# Patient Record
Sex: FEMALE
[De-identification: names, ages, dates, MRNs, and addresses within clinical notes are randomized per-mention and may not be internally consistent; named-entity substitution may affect disease eponyms.]

---

## 2017-11-20 ENCOUNTER — HOSPITAL ENCOUNTER (OUTPATIENT)
Dept: HOSPITAL 63 - SPEC | Age: 52
Discharge: HOME | End: 2017-11-20
Attending: NURSE PRACTITIONER
Payer: COMMERCIAL

## 2017-11-20 DIAGNOSIS — I10: ICD-10-CM

## 2017-11-20 DIAGNOSIS — E11.9: Primary | ICD-10-CM

## 2017-11-20 LAB
ALBUMIN SERPL-MCNC: 3.9 G/DL (ref 3.4–5)
ALBUMIN/GLOB SERPL: 0.9 {RATIO} (ref 1–1.7)
ALP SERPL-CCNC: 113 U/L (ref 46–116)
ALT SERPL-CCNC: 19 U/L (ref 14–59)
ANION GAP SERPL CALC-SCNC: 7 MMOL/L (ref 6–14)
AST SERPL-CCNC: 14 U/L (ref 15–37)
BILIRUB SERPL-MCNC: 0.2 MG/DL (ref 0.2–1)
BUN/CREAT SERPL: 27 (ref 6–20)
CA-I SERPL ISE-MCNC: 19 MG/DL (ref 7–20)
CALCIUM SERPL-MCNC: 10 MG/DL (ref 8.5–10.1)
CHLORIDE SERPL-SCNC: 103 MMOL/L (ref 98–107)
CO2 SERPL-SCNC: 28 MMOL/L (ref 21–32)
CREAT SERPL-MCNC: 0.7 MG/DL (ref 0.6–1)
GFR SERPLBLD BASED ON 1.73 SQ M-ARVRAT: 88.2 ML/MIN
GLOBULIN SER-MCNC: 4.2 G/DL (ref 2.2–3.8)
GLUCOSE SERPL-MCNC: 111 MG/DL (ref 70–99)
POTASSIUM SERPL-SCNC: 4.2 MMOL/L (ref 3.5–5.1)
PROT SERPL-MCNC: 8.1 G/DL (ref 6.4–8.2)
SODIUM SERPL-SCNC: 138 MMOL/L (ref 136–145)

## 2017-11-20 PROCEDURE — 84443 ASSAY THYROID STIM HORMONE: CPT

## 2017-11-20 PROCEDURE — 80053 COMPREHEN METABOLIC PANEL: CPT

## 2017-11-20 PROCEDURE — 83036 HEMOGLOBIN GLYCOSYLATED A1C: CPT

## 2017-11-20 PROCEDURE — 36415 COLL VENOUS BLD VENIPUNCTURE: CPT

## 2017-11-20 PROCEDURE — 80061 LIPID PANEL: CPT

## 2017-11-21 LAB
CHOLEST SERPL-MCNC: 160 MG/DL (ref 0–200)
CHOLEST/HDLC SERPL: 4 {RATIO}
HDLC SERPL-MCNC: 39 MG/DL (ref 40–60)
LDLC: 92 MG/DL (ref 0–100)
THYROID STIM HORMONE (TSH): 1.27 UIU/ML (ref 0.36–3.74)
TRIGL SERPL-MCNC: 146 MG/DL (ref 0–150)
VLDLC: 29 MG/DL (ref 0–40)

## 2017-11-22 LAB — HBA1C MFR BLD: 6.3 % (ref 4.8–5.6)

## 2018-08-21 ENCOUNTER — HOSPITAL ENCOUNTER (OUTPATIENT)
Dept: HOSPITAL 63 - ECHO | Age: 53
Discharge: HOME | End: 2018-08-21
Payer: COMMERCIAL

## 2018-08-21 DIAGNOSIS — E11.9: ICD-10-CM

## 2018-08-21 DIAGNOSIS — I10: ICD-10-CM

## 2018-08-21 DIAGNOSIS — I36.1: Primary | ICD-10-CM

## 2018-08-21 DIAGNOSIS — E66.9: ICD-10-CM

## 2018-08-21 PROCEDURE — 93306 TTE W/DOPPLER COMPLETE: CPT

## 2018-08-21 NOTE — CARD
MR#: [id number]

Account#: [id number]

Accession#: [id number]

Date of Study: 08/21/2018

Ordering Physician: ELAN LIVE, 

Referring Physician: ELAN LIVE, 

Tech: FORTUNATO Damon





--------------- APPROVED REPORT --------------





EXAM: Two-dimensional and M-mode echocardiogram with Doppler and color Doppler.



Other Information 

Quality : AverageHR: 88bpm



INDICATION

CVA/TIA 



RISK FACTORS

Hypertension 

Obesity   



2D DIMENSIONS 

RVDd3.0 (2.9-3.5cm)Left Atrium(2D)3.8 (1.6-4.0cm)

IVSd1.7 (0.7-1.1cm)Aortic Root(2D)3.3 (2.0-3.7cm)

LVDd3.4 (3.9-5.9cm)LVOT Diameter2.0 (1.8-2.4cm)

PWd2.2 (0.7-1.1cm)LVDs2.5 (2.5-4.0cm)

FS (%) 27.4 %SV26.7 ml

LVEF(%)54.4 (>50%)



Aortic Valve

AoV Peak Cruz.139.0cm/sAoV VTI27.6cm

AO Peak GR.7.7mmHgLVOT Peak Cruz.85.2cm/s

LVOT  VTI 18.20cmAO Mean GR.5mmHg

STUART (VMAX)1.58gv9HEJ   (VTI)2.00cm2



Pulmonary Valve

PV Peak Rplhupkf08.0cm/sPV Peak Grad.3mmHg



Tricuspid Valve

TR P. Zihqjcrq065jh/sRAP UURVWUOV4sbUz

TR Peak Gr.31zoYeCEMM79fjGu



Pulmonary Vein

S1 Edieccjh86.6cm/sD2 Szxusojv82.7cm/s



 LEFT VENTRICLE 

The left ventricle is normal size. There is moderate concentric left ventricular hypertrophy. Proxima
l septal thickening is noted. The left ventricular systolic function is normal. The ejection fraction
 is estimated at 60-65%. There is normal LV segmental wall motion. Transmitral Doppler flow pattern i
s Grade I-abnormal relaxation pattern.



 RIGHT VENTRICLE 

The right ventricle is normal size. The right ventricular systolic function is normal.



 ATRIA 

The left atrium size is normal. The right atrium size is normal. The interatrial septum is intact wit
h no evidence for an atrial septal defect or patent foramen ovale as noted on 2-D or Doppler imaging.




 AORTIC VALVE 

The aortic valve is normal in structure. Doppler and Color Flow revealed no significant aortic regurg
itation. There is no significant aortic valvular stenosis. There is no aortic valvular vegetation.



 MITRAL VALVE 

The mitral valve is thickened but opens well. There is no evidence of mitral valve prolapse. There is
 no mitral valve stenosis. Doppler and Color Flow revealed no mitral valve regurgitation noted.



 TRICUSPID VALVE 

The tricuspid valve leaflets are thickened , but open well. Doppler and Color Flow revealed trace tri
cuspid regurgitation. The PA pressure was estimated at 28 mmHg. There is no tricuspid valve prolapse 
or vegetation. There is no tricuspid valve stenosis.



 PULMONIC VALVE 

The pulmonic valve is not well visualized. Doppler and Color Flow revealed no pulmonic valvular regur
gitation. There is no pulmonic valvular stenosis.



 GREAT VESSELS 

The aortic root is normal in size. The IVC is normal in size and collapses <50% with inspiration.



 PERICARDIAL EFFUSION 

There is no pleural effusion. There is no evidence of significant pericardial effusion.



Critical Notification

Critical Value: No



<Conclusion>

The left ventricular systolic function is normal.

The ejection fraction is estimated at 60-65%.

There is normal LV segmental wall motion.

Transmitral Doppler flow pattern is Grade I-abnormal relaxation pattern.

Trace tricuspid regurgitation.

The PA pressure was estimated at 28 mmHg.

There is no evidence of significant pericardial effusion.



Signed by : Ziyad Stone, 

Electronically Approved : 08/21/2018 14:33:55

## 2021-09-14 ENCOUNTER — HOSPITAL ENCOUNTER (OUTPATIENT)
Dept: HOSPITAL 63 - MAMMO | Age: 56
End: 2021-09-14
Attending: FAMILY MEDICINE
Payer: COMMERCIAL

## 2021-09-14 DIAGNOSIS — Z12.31: Primary | ICD-10-CM

## 2021-09-14 PROCEDURE — 77067 SCR MAMMO BI INCL CAD: CPT

## 2021-09-14 NOTE — RAD
EXAM: Bilateral digital screening mammogram.



HISTORY: 56-year-old female presents for screening mammography.



TECHNIQUE: Full-field digital craniocaudal and mediolateral oblique images of both breasts are obtain
ed for evaluation. Computer aided detection was applied.



COMPARISON: None. This exam serves as a baseline mammogram.



BREAST PARENCHYMAL DENSITY: Level B - Scattered fibroglandular densities.



FINDINGS: There is no suspicious mass, microcalcification or region of architectural distortion.



IMPRESSION: BI-RADS Category 2: Benign finding(s). 



RECOMMENDATION: Annual mammography is recommended.



If your mammogram demonstrates that you have dense breast tissue, which could hide abnormalities, and
 if you have other risk factors for breast cancer that have been identified, you might benefit from s
upplemental screening tests that may be suggested by your ordering physician.  Dense breast tissue, i
n and of itself, is a relatively common condition.  This information is not provided to cause undue c
oncern, but rather to raise your awareness and to promote discussion with your physician regarding th
e presence of other risk factors, in addition to dense breast tissue. A report of your mammography re
sults will be sent to you and your physician.  You should contact your physician if you have any ques
tions or concerns regarding this report.  



Mammography is a sensitive method for finding small breast cancers, but it does not detect them all a
nd is not a substitute for careful clinical examination.  A negative mammogram does not negate a clin
ically suspicious finding and should not result in delay in biopsying a clinically suspicious abnorma
lity.



PQRS compliance statement -  Patient information was entered into a reminder system with a target due
 date for the next mammogram. 



"Our facility is accredited by the [employer] Mammography Program."



Electronically signed by: Hanna Ceja MD (9/14/2021 12:44 PM) [id number]